# Patient Record
Sex: MALE | Race: WHITE | NOT HISPANIC OR LATINO | URBAN - METROPOLITAN AREA
[De-identification: names, ages, dates, MRNs, and addresses within clinical notes are randomized per-mention and may not be internally consistent; named-entity substitution may affect disease eponyms.]

---

## 2017-02-01 ENCOUNTER — APPOINTMENT (RX ONLY)
Dept: URBAN - METROPOLITAN AREA CLINIC 25 | Facility: CLINIC | Age: 22
Setting detail: DERMATOLOGY
End: 2017-02-01

## 2017-02-01 DIAGNOSIS — L40.0 PSORIASIS VULGARIS: ICD-10-CM | Status: WELL CONTROLLED

## 2017-02-01 DIAGNOSIS — L70.0 ACNE VULGARIS: ICD-10-CM

## 2017-02-01 PROCEDURE — ? PRESCRIPTION

## 2017-02-01 PROCEDURE — 99213 OFFICE O/P EST LOW 20 MIN: CPT | Mod: 25

## 2017-02-01 PROCEDURE — 11900 INJECT SKIN LESIONS </W 7: CPT

## 2017-02-01 PROCEDURE — ? TREATMENT REGIMEN

## 2017-02-01 PROCEDURE — ? INTRALESIONAL KENALOG

## 2017-02-01 RX ORDER — TRETINOIN 0.5 MG/G
CREAM TOPICAL
Qty: 1 | Refills: 1 | Status: ERX

## 2017-02-01 RX ORDER — DOXYCYCLINE HYCLATE 100 MG/1
CAPSULE, GELATIN COATED ORAL
Qty: 30 | Refills: 2 | Status: ERX

## 2017-02-01 RX ORDER — CLOBETASOL PROPIONATE 0.46 MG/ML
SOLUTION TOPICAL
Qty: 1 | Refills: 2 | Status: ERX

## 2017-02-01 RX ORDER — MOMETASONE FUROATE 0.1 %
CREAM (GRAM) TOPICAL
Qty: 1 | Refills: 1 | Status: ERX

## 2017-02-01 RX ORDER — FLUOCINOLONE ACETONIDE 0.11 MG/ML
OIL TOPICAL
Qty: 1 | Refills: 2 | Status: ERX

## 2017-02-01 ASSESSMENT — LOCATION DETAILED DESCRIPTION DERM
LOCATION DETAILED: LEFT SUPERIOR MEDIAL BUCCAL CHEEK
LOCATION DETAILED: RIGHT SUPERIOR LATERAL BUCCAL CHEEK
LOCATION DETAILED: LEFT SUPERIOR PARIETAL SCALP
LOCATION DETAILED: LEFT CENTRAL SUBMANDIBULAR CHEEK
LOCATION DETAILED: RIGHT SUPERIOR MEDIAL UPPER BACK
LOCATION DETAILED: RIGHT SUPERIOR UPPER BACK
LOCATION DETAILED: RIGHT MEDIAL SUBMANDIBULAR CHEEK
LOCATION DETAILED: LEFT MEDIAL SUPERIOR CHEST

## 2017-02-01 ASSESSMENT — LOCATION SIMPLE DESCRIPTION DERM
LOCATION SIMPLE: RIGHT UPPER BACK
LOCATION SIMPLE: CHEST
LOCATION SIMPLE: SCALP
LOCATION SIMPLE: RIGHT CHEEK
LOCATION SIMPLE: LEFT CHEEK

## 2017-02-01 ASSESSMENT — LOCATION ZONE DERM
LOCATION ZONE: FACE
LOCATION ZONE: SCALP
LOCATION ZONE: TRUNK

## 2017-02-01 NOTE — PROCEDURE: MIPS QUALITY
Quality 130: Documentation Of Current Medications In The Medical Record: Current Medications Documented
Quality 111:Pneumonia Vaccination Status For Older Adults: Pneumococcal Vaccination Previously Received
Quality 400a: One-Time Screening For Hepatitis C Virus (Hcv) For All Patients: One-time screening for HCV infection not received within 12 month period and no documentation of prior screening, reasont not given.
Quality 226: Preventive Care And Screening: Tobacco Use: Screening And Cessation Intervention: Patient screened for tobacco and never smoked
Quality 110: Preventive Care And Screening: Influenza Immunization: Influenza Immunization Administered during Influenza season
Quality 131: Pain Assessment And Follow-Up: Pain assessment using a standardized tool is documented as negative, no follow-up plan required
Detail Level: Generalized
Quality 128: Preventive Care And Screening: Body Mass Index (Bmi) Screening And Follow-Up Plan: BMI is documented within normal parameters and no follow-up plan is required.
Quality 154 Part A: Falls: Risk Assessment (Should Be Reported With Measure 155.): Falls risk assessment completed and documented in the past 12 months.
Quality 154 Part B: Falls: Risk Screening (Should Be Reported With Measure 155.): Patient screened for future fall risk; documentation of no falls in the past year or only one fall without injury in the past year
Quality 431: Preventive Care And Screening: Unhealthy Alcohol Use - Screening: Patient screened for unhealthy alcohol use using a single question and scores less than 2 times per year
Quality 134: Screening For Clinical Depression And Follow-Up Plan: The patient was screened for depression and the screen was negative and no follow up required
Quality 47: Advance Care Plan: Advance Care Planning discussed and documented; advance care plan or surrogate decision maker documented in the medical record.

## 2017-02-01 NOTE — PROCEDURE: TREATMENT REGIMEN
Action 3: Continue
Continue Regimen: Benzoyl Peroxide 5% wash QD\\nTretinoin 0.05% cream QHS
Detail Level: Detailed
Modify Regimen: Doxycycline 100mg one p.o. QD
Continue Regimen: Mometasone cream BID prn \\nDermasmoothe scalp oil QHS\\nClobetasol solution BID\\nT gel shampoo wash QD

## 2017-02-01 NOTE — PROCEDURE: INTRALESIONAL KENALOG
X Size Of Lesion In Cm (Optional): 0
Total Volume Injected (Ccs- Only Use Numbers And Decimals): 0.2
Concentration Of Solution Injected (Mg/Ml): 5.0
Kenalog Preparation: Kenalog
Detail Level: Detailed
Consent: The risks of atrophy were reviewed with the patient.
Size Of Lesion (Optional): -

## 2017-04-12 ENCOUNTER — APPOINTMENT (RX ONLY)
Dept: URBAN - METROPOLITAN AREA CLINIC 25 | Facility: CLINIC | Age: 22
Setting detail: DERMATOLOGY
End: 2017-04-12

## 2017-04-12 VITALS — HEART RATE: 80 BPM | SYSTOLIC BLOOD PRESSURE: 163 MMHG | DIASTOLIC BLOOD PRESSURE: 107 MMHG

## 2017-04-12 DIAGNOSIS — L40.0 PSORIASIS VULGARIS: ICD-10-CM | Status: WELL CONTROLLED

## 2017-04-12 DIAGNOSIS — L70.0 ACNE VULGARIS: ICD-10-CM | Status: IMPROVED

## 2017-04-12 PROCEDURE — ? PRESCRIPTION

## 2017-04-12 PROCEDURE — ? TREATMENT REGIMEN

## 2017-04-12 PROCEDURE — ? COUNSELING

## 2017-04-12 PROCEDURE — 99214 OFFICE O/P EST MOD 30 MIN: CPT

## 2017-04-12 RX ORDER — CLOBETASOL PROPIONATE 0.46 MG/ML
SOLUTION TOPICAL
Qty: 1 | Refills: 1 | Status: ERX

## 2017-04-12 RX ORDER — MOMETASONE FUROATE 0.1 %
CREAM (GRAM) TOPICAL
Qty: 1 | Refills: 1 | Status: ERX

## 2017-04-12 ASSESSMENT — LOCATION ZONE DERM
LOCATION ZONE: TRUNK
LOCATION ZONE: SCALP
LOCATION ZONE: FACE
LOCATION ZONE: NECK

## 2017-04-12 ASSESSMENT — LOCATION SIMPLE DESCRIPTION DERM
LOCATION SIMPLE: LEFT ANTERIOR NECK
LOCATION SIMPLE: LEFT CHEEK
LOCATION SIMPLE: LEFT UPPER BACK
LOCATION SIMPLE: SCALP

## 2017-04-12 ASSESSMENT — PGA PSORIASIS: PGA PSORIASIS: MILD (MILD PLAQUE ELEVATION, LIGHT ERYTHEMA, FINE SCALE PREDOMINATES)

## 2017-04-12 ASSESSMENT — LOCATION DETAILED DESCRIPTION DERM
LOCATION DETAILED: LEFT INFERIOR ANTERIOR NECK
LOCATION DETAILED: RIGHT CENTRAL PARIETAL SCALP
LOCATION DETAILED: LEFT MEDIAL MALAR CHEEK
LOCATION DETAILED: LEFT SUPERIOR LATERAL UPPER BACK

## 2017-04-12 NOTE — PROCEDURE: TREATMENT REGIMEN
Action 1: Continue
Detail Level: Detailed
Discontinue Regimen: Doxycycline 100 mg po qd
Continue Regimen: Clobetasol 0.05% solution bid on scalp and T gell shampoo qd on scalp, mometasone 0.1% cream bid prn on affected area of body
Continue Regimen: tretinoin cream 0.05% qhs on face and bp wash 5% QAM